# Patient Record
(demographics unavailable — no encounter records)

---
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
## 2024-12-03 NOTE — HEALTH RISK ASSESSMENT
[Very Good] : ~his/her~  mood as very good [No] : No [1 or 2 (0 pts)] : 1 or 2 (0 points) [Never (0 pts)] : Never (0 points) [No falls in past year] : Patient reported no falls in the past year [0] : 2) Feeling down, depressed, or hopeless: Not at all (0) [PHQ-2 Negative - No further assessment needed] : PHQ-2 Negative - No further assessment needed [Never] : Never [NO] : No [None] : None [Employed] : employed [] :  [# Of Children ___] : has [unfilled] children [Sexually Active] : sexually active [Feels Safe at Home] : Feels safe at home [Fully functional (bathing, dressing, toileting, transferring, walking, feeding)] : Fully functional (bathing, dressing, toileting, transferring, walking, feeding) [Fully functional (using the telephone, shopping, preparing meals, housekeeping, doing laundry, using] : Fully functional and needs no help or supervision to perform IADLs (using the telephone, shopping, preparing meals, housekeeping, doing laundry, using transportation, managing medications and managing finances) [de-identified] : exercising [de-identified] : fine [[id number]] : 0 [Change in mental status noted] : No change in mental status noted [Language] : denies difficulty with language [Behavior] : denies difficulty with behavior [Learning/Retaining New Information] : denies difficulty learning/retaining new information [Handling Complex Tasks] : denies difficulty handling complex tasks [Reasoning] : denies difficulty with reasoning [Spatial Ability and Orientation] : denies difficulty with spatial ability and orientation [Reports changes in hearing] : Reports no changes in hearing [Reports changes in vision] : Reports no changes in vision [Reports changes in dental health] : Reports no changes in dental health

## 2025-02-05 NOTE — HISTORY OF PRESENT ILLNESS
[FreeTextEntry1] : Rt UQ pain [de-identified] : pain since twisting bending crampy ruq pain constant discomfort worse after food coughing sneezing not worse after fatty food  bowels regular no change

## 2025-02-25 NOTE — PHYSICAL EXAM
[Normal Sclera/Conjunctiva] : normal sclera/conjunctiva [Normal Gait] : normal gait [Alert and Oriented x3] : oriented to person, place, and time [Normal] : affect was normal and insight and judgment were intact [de-identified] : Right knee: Normal visual inspection, normal temp. to touch, tender in medial epicondyle area, normal popliteal fossa w/ 2+ pulses, ROM intact however further manipulation such as anterior or posterior drawer test not tolerable due to pain. Able to bear weight. Baseline has limp due to injury of right foot.

## 2025-02-25 NOTE — HISTORY OF PRESENT ILLNESS
[de-identified] : 41M here for a f/u w/ CC of Right Knee Pain.  Patient recently began PT sessions on his right knee. One week ago after his 2nd session he had severe pain and discomfort of his right knee, specifically in the medial compartment area. He initially thought this was due to the PT session and his surgeon mentioned there may be some soreness after such sessions. He takes daily 2g tylenol and oral diclofenac 50mg BID. He has not attended any more PT sessions since the one last week. He feels the pain is still significant both at rest and with weight-bearing. He last saw ortho back in oct 2024 for post-op.  Additionally, his issues w/ ED and premature ejaculation have returned after being off viagra 50mg PRN for few months. He has issues w/ maintaining erections and ejaculation sometimes occurs in less than a minute.  to wife of 13 yrs.

## 2025-02-25 NOTE — PLAN
[FreeTextEntry1] : #Right Knee Pain Suspect patient is having pain from recent exercise from PT sessions. Rec to f/u with surgeon for further evaluation. For the time-being will provide oxycodone 10mg to use as needed given severity of pain is not controlled w/ tylenol and diclofenac.   #Premature ejaculation Discussed options of SSRIs, PDE-5-Is, and topical agents. Patient would not like to avoid antidepressants at all costs. Would like to switch to longer-acting PDE-5-I.  -Stop viagra -Start cialis 10mg PRN

## 2025-03-25 NOTE — ASSESSMENT
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

## 2025-03-25 NOTE — PHYSICAL EXAM

## 2025-06-19 NOTE — HISTORY OF PRESENT ILLNESS
[FreeTextEntry1] : stress [de-identified] : stress   moved to OB knee pain and leg pain persisst had knee surgery has other issues distal leg